# Patient Record
Sex: FEMALE | Race: BLACK OR AFRICAN AMERICAN
[De-identification: names, ages, dates, MRNs, and addresses within clinical notes are randomized per-mention and may not be internally consistent; named-entity substitution may affect disease eponyms.]

---

## 2017-06-13 ENCOUNTER — HOSPITAL ENCOUNTER (EMERGENCY)
Dept: HOSPITAL 31 - C.ER | Age: 42
Discharge: HOME | End: 2017-06-13
Payer: COMMERCIAL

## 2017-06-13 VITALS — RESPIRATION RATE: 18 BRPM | TEMPERATURE: 98.2 F | DIASTOLIC BLOOD PRESSURE: 75 MMHG | SYSTOLIC BLOOD PRESSURE: 132 MMHG

## 2017-06-13 VITALS — OXYGEN SATURATION: 98 % | HEART RATE: 75 BPM

## 2017-06-13 DIAGNOSIS — J06.9: Primary | ICD-10-CM

## 2017-06-13 NOTE — C.PDOC
History Of Present Illness


43 y/o female c/o sore throat, left side sinus pressure and facial pain x 3 days

, with subjective fever this morning. 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 

for are both being treated for strep throat.  c/o yellowish green nasal 

discharge. no ear pain. no cough or  sob.  


Time Seen by Provider: 06/13/17 10:58


Chief Complaint (Nursing): ENT Problem


History Per: Patient


History/Exam Limitations: None


Onset/Duration Of Symptoms: Days (3)


Current Symptoms Are (Timing): Still Present


Quality (Mouth/Throat): Other


Severity: Moderate


Anticoagulant/Antiplatlet Use?: No





Past Medical History


Reviewed: Historical Data, Nursing Documentation, Vital Signs


Vital Signs: 


 Last Vital Signs











Temp  98.2 F   06/13/17 12:19


 


Pulse  75   06/13/17 12:19


 


Resp  18   06/13/17 12:19


 


BP  132/75   06/13/17 12:19


 


Pulse Ox  98   06/13/17 12:34














- Medical History


Other PMH: sinus problems


Surgical History: No Surg Hx


Family History: States: Unknown Family Hx





- Social History


Hx Tobacco Use: No


Hx Alcohol Use: Yes


Hx Substance Use: No





- Immunization History


Hx Tetanus Toxoid Vaccination: No


Hx Influenza Vaccination: No


Hx Pneumococcal Vaccination: No





Review Of Systems


Constitutional: Positive for: Fever.  Negative for: Chills


ENT: Positive for: Throat Pain


Cardiovascular: Negative for: Chest Pain


Respiratory: Negative for: Shortness of Breath


Gastrointestinal: Negative for: Nausea, Vomiting, Diarrhea


Skin: Negative for: Rash





Physical Exam





- Physical Exam


Appears: Non-toxic, No Acute Distress


Skin: Normal Color, Warm


Head: Atraumatic, Normacephalic


Nose: Tenderness (Left Maxillary sinus Tenderness)


Oral Mucosa: Moist


Throat: Erythema (Mild erythema in throat), No Exudate


Neck: Normal ROM


Chest: Symmetrical


Cardiovascular: Rhythm Regular, No Murmur


Respiratory: No Rales, No Rhonchi, No Wheezing


Gastrointestinal/Abdominal: Soft, No Tenderness, No Guarding, No Rebound


Extremity: Normal ROM


Neurological/Psych: Oriented x3, Normal Speech, Normal Cognition





ED Course And Treatment


O2 Sat by Pulse Oximetry: 98 (RA)


Pulse Ox Interpretation: Normal





Medical Decision Making


Medical Decision Making: 





pt with sore throat and left side sinus pressure; rapid strep neg.  pt 

requesting rx for antibiotics in case symptoms worsen as she won't be able to 

get time off to see physician. I explained to patient no indication for 

antibiotics at this time, recommend analgesic and decongestant. 





Disposition


Counseled Patient/Family Regarding: Diagnosis, Need For Followup, Rx Given





- Disposition


Referrals: 


[facility] [Outside]


[facility] [Outside]


Disposition: HOME/ ROUTINE


Disposition Time: 12:06


Condition: STABLE


Additional Instructions: 


Gargle with warm salty water several times a day.  Take Sudafed or other 

decongestant as directed.  Neti pot or nasal saline will be helpful.  Take 

Tylenol or Ibuprofen for fever or pain.  Follow up in Medical clinic in a few 

days.   


Prescriptions: 


Azithromycin [Z-Rodger] 250 mg PO DAILY #6 tab


Instructions:  Pharyngitis (ED), Sinusitis (ED), Nasal Rinse (ED)





- Clinical Impression


Clinical Impression: 


 Upper respiratory infection








- PA / NP / Resident Statement


MD/DO has reviewed & agrees with the documentation as recorded.





- Scribe Statement


The provider has reviewed the documentation as recorded by the Tasneemibbina Mg





All medical record entries made by the Tasneemibbina were at my direction and 

personally dictated by me. I have reviewed the chart and agree that the record 

accurately reflects my personal performance of the history, physical exam, 

medical decision making, and the department course for this patient. I have 

also personally directed, reviewed, and agree with the discharge instructions 

and disposition.

## 2017-07-24 ENCOUNTER — HOSPITAL ENCOUNTER (EMERGENCY)
Dept: HOSPITAL 31 - C.ER | Age: 42
LOS: 1 days | Discharge: HOME | End: 2017-07-25
Payer: COMMERCIAL

## 2017-07-24 VITALS
TEMPERATURE: 98.3 F | SYSTOLIC BLOOD PRESSURE: 144 MMHG | RESPIRATION RATE: 17 BRPM | OXYGEN SATURATION: 95 % | HEART RATE: 81 BPM | DIASTOLIC BLOOD PRESSURE: 84 MMHG

## 2017-07-24 DIAGNOSIS — J32.9: Primary | ICD-10-CM

## 2017-07-25 NOTE — C.PDOC
History Of Present Illness


42 year old female with a Hx of sinusitis who presents to the ER with a 

complaint of a pressure headache and left sided facial and throat pain for the 

past week. Patient has been taking OTC medication with no relief to symptoms. 

Denies fever or chills.


Time Seen by Provider: 07/25/17 00:14


Chief Complaint (Nursing): ENT Problem


History Per: Patient


History/Exam Limitations: no limitations


Onset/Duration Of Symptoms: Days


Location Of Pain: Sinus/es, Headache


Associated Symptoms: denies: Fever, Chills


Ear Symptoms: Bilateral: None


Recent travel outside of the [place]: No





Past Medical History


Reviewed: Historical Data, Nursing Documentation, Vital Signs


Vital Signs: 


 Last Vital Signs











Temp  98.3 F   07/24/17 23:54


 


Pulse  81   07/24/17 23:54


 


Resp  17   07/24/17 23:54


 


BP  144/84   07/24/17 23:54


 


Pulse Ox  95   07/25/17 03:18














- Medical History


PMH: No Chronic Diseases


Surgical History: No Surg Hx


Family History: States: Unknown Family Hx





- Social History


Hx Tobacco Use: No


Hx Alcohol Use: Yes


Hx Substance Use: No





- Immunization History


Hx Tetanus Toxoid Vaccination: No


Hx Influenza Vaccination: No


Hx Pneumococcal Vaccination: No





Review Of Systems


Constitutional: Negative for: Fever, Chills


Neurological: Positive for: Headache





Physical Exam





- Physical Exam


Appears: Non-toxic


Skin: Normal Color, Warm, Dry


Head: Atraumatic, Normacephalic, Other (Left frontal sinus tenderness)


Eye(s): bilateral: Normal Inspection, PERRL, EOMI


Ear(s): Bilateral: Normal


Nose: Other (Enlarged nasal turbinates)


Oral Mucosa: Moist


Throat: Normal, No Erythema, No Exudate


Neck: Normal, Supple


Chest: Symmetrical, No Tenderness


Cardiovascular: Rhythm Regular, No Murmur


Respiratory: Normal Breath Sounds, No Rales, No Rhonchi, No Wheezing


Neurological/Psych: Oriented x3, Normal Speech, Normal Cognition





ED Course And Treatment


O2 Sat by Pulse Oximetry: 95 (Room air)


Pulse Ox Interpretation: Normal


Progress Note: Tylenol administered. On reevaluation, patient feels her pain as 

much improved, will instruct to follow up with PMD for further evaluation.





Disposition





- Disposition


Referrals: 


[facility] [Outside]


Disposition: HOME/ ROUTINE


Disposition Time: 00:19


Condition: STABLE


Additional Instructions: 


Take meds as directed





Follow up with PMD





Return to ER if worse 


Prescriptions: 


Azithromycin [Zithromax] 250 mg PO DAILY #6 tab


Cetirizine HCl [Zyrtec] 10 mg PO DAILY #20 capsule


Instructions:  Sinusitis (ED)





- Clinical Impression


Clinical Impression: 


 Sinusitis








- Scribe Statement


The provider has reviewed the documentation as recorded by the Scribbina Blackwell





All medical record entries made by the Jory were at my direction and 

personally dictated by me. I have reviewed the chart and agree that the record 

accurately reflects my personal performance of the history, physical exam, 

medical decision making, and the department course for this patient. I have 

also personally directed, reviewed, and agree with the discharge instructions 

and disposition.

## 2017-07-26 ENCOUNTER — HOSPITAL ENCOUNTER (EMERGENCY)
Dept: HOSPITAL 31 - C.ER | Age: 42
Discharge: HOME | End: 2017-07-26
Payer: COMMERCIAL

## 2017-07-26 VITALS
DIASTOLIC BLOOD PRESSURE: 115 MMHG | OXYGEN SATURATION: 99 % | SYSTOLIC BLOOD PRESSURE: 169 MMHG | TEMPERATURE: 98.3 F | RESPIRATION RATE: 14 BRPM | HEART RATE: 90 BPM

## 2017-07-26 DIAGNOSIS — R51: Primary | ICD-10-CM

## 2017-07-26 NOTE — C.PDOC
History Of Present Illness





43 yo female, presnets with ha, left sided sinus congestion, sore throat, since 

monday. pt was seen on monday d/c on antibiotics for sinusitis. pt states pain 

worsened. t states subjective fever last night. no abdominal pain, no cough, no 

other complaints


Time Seen by Provider: 07/26/17 09:33


Chief Complaint (Nursing): Headache





Past Medical History


Reviewed: Historical Data, Nursing Documentation, Vital Signs


Vital Signs: 


 Last Vital Signs











Temp  98.3 F   07/26/17 10:21


 


Pulse  90   07/26/17 10:21


 


Resp  14   07/26/17 10:21


 


BP  169/115 H  07/26/17 10:21


 


Pulse Ox  99   07/26/17 10:21











Family History: States: Unknown Family Hx





- Social History


Hx Tobacco Use: No


Hx Alcohol Use: Yes


Hx Substance Use: No





- Immunization History


Hx Tetanus Toxoid Vaccination: No


Hx Influenza Vaccination: No


Hx Pneumococcal Vaccination: No





Review Of Systems


Constitutional: Positive for: Fever


ENT: Positive for: Throat Pain, Other (left sided sinus congestion).  Negative 

for: Ear Pain


Respiratory: Negative for: Cough





Physical Exam





- Physical Exam


Appears: Well, No Acute Distress, Other (smiling in nad)


Skin: Normal Color, Warm, Dry


Head: Atraumatic, Normacephalic, Other ((+)left maxially sinus ttp)


Eye(s): bilateral: Normal Inspection, PERRL, EOMI


Nose: Normal


Oral Mucosa: Moist


Tongue: Normal Appearing


Lips: Normal Appearing


Throat: Erythema, No Exudate


Neck: Normal


Cardiovascular: Rhythm Regular


Respiratory: Normal Breath Sounds


Gastrointestinal/Abdominal: Normal Exam


Back: Normal Inspection


Extremity: Normal ROM


Neurological/Psych: Oriented x3, Normal Speech, Normal Cognition, Normal 

Cranial Nerves, Cerebellar Signs, Normal Motor, Normal Sensation





Medical Decision Making


Medical Decision Making: 


suspect ha 2/2 sinusitis. ct neg. pt well appeairng, neuro intact, notified of 

high blood pressure in er, advised outpt f/u. pt states pain resolved





Disposition





- Disposition


Referrals: 


Behin,Babak, MD [Staff Provider] - 


Disposition: HOME/ ROUTINE


Disposition Time: 10:25


Condition: STABLE


Additional Instructions: 


please see specialsit. return to er with worsening symptoms or concerns. please 

continue medications previous prescribed.


Prescriptions: 


Fluticasone Propionate [Flonase] 1 spr NS DAILY #1 bottle


Naproxen [Naprosyn] 500 mg PO BID PRN #14 tablet


 PRN Reason: Pain, Mild (1-3)


Instructions:  Sinusitis (ED), Acute Headache (ED)


Forms:  [facility] (English)





- Clinical Impression


Clinical Impression: 


 Headache

## 2017-07-26 NOTE — CT
PROCEDURE:  CT HEAD WITHOUT CONTRAST.



HISTORY:

ha



COMPARISON:

None available. 



TECHNIQUE:

Axial computed tomography images were obtained through the head/brain 

without intravenous contrast.  



Radiation dose:



Total exam DLP = 859.11 mGy-cm.



This CT exam was performed using one or more of the following dose 

reduction techniques: Automated exposure control, adjustment of the 

mA and/or kV according to patient size, and/or use of iterative 

reconstruction technique.



FINDINGS:



HEMORRHAGE:

No intracranial hemorrhage. 



BRAIN:

No mass effect or edema.  The gray-white matter differentiation 

appears intact. Please note that MRI with diffusion imaging is more 

sensitive in the detection of acute ischemic event.



VENTRICLES:

No hydrocephalus. 



CALVARIUM:

Unremarkable.



PARANASAL SINUSES:

Unremarkable as visualized. No significant inflammatory changes.



MASTOID AIR CELLS:

Unremarkable as visualized. No inflammatory changes.



OTHER FINDINGS:

None.



IMPRESSION:

No acute intracranial pathology identified.

## 2018-10-29 ENCOUNTER — HOSPITAL ENCOUNTER (EMERGENCY)
Dept: HOSPITAL 31 - C.ER | Age: 43
Discharge: HOME | End: 2018-10-29
Payer: SELF-PAY

## 2018-10-29 VITALS
RESPIRATION RATE: 18 BRPM | TEMPERATURE: 98.3 F | HEART RATE: 75 BPM | DIASTOLIC BLOOD PRESSURE: 97 MMHG | SYSTOLIC BLOOD PRESSURE: 143 MMHG

## 2018-10-29 VITALS — OXYGEN SATURATION: 100 %

## 2018-10-29 VITALS — BODY MASS INDEX: 27.3 KG/M2

## 2018-10-29 DIAGNOSIS — N93.8: Primary | ICD-10-CM

## 2018-10-29 LAB
ALBUMIN SERPL-MCNC: 4 G/DL (ref 3.5–5)
ALBUMIN/GLOB SERPL: 1.3 {RATIO} (ref 1–2.1)
ALT SERPL-CCNC: 33 U/L (ref 9–52)
APTT BLD: 38 SECONDS (ref 21–34)
AST SERPL-CCNC: 24 U/L (ref 14–36)
BASOPHILS # BLD AUTO: 0.1 K/UL (ref 0–0.2)
BASOPHILS NFR BLD: 0.9 % (ref 0–2)
BUN SERPL-MCNC: 15 MG/DL (ref 7–17)
CALCIUM SERPL-MCNC: 9.5 MG/DL (ref 8.6–10.4)
EOSINOPHIL # BLD AUTO: 0.6 K/UL (ref 0–0.7)
EOSINOPHIL NFR BLD: 7.5 % (ref 0–4)
ERYTHROCYTE [DISTWIDTH] IN BLOOD BY AUTOMATED COUNT: 14.3 % (ref 11.5–14.5)
GFR NON-AFRICAN AMERICAN: > 60
HGB BLD-MCNC: 12.6 G/DL (ref 11–16)
INR PPP: 1
LYMPHOCYTES # BLD AUTO: 2.2 K/UL (ref 1–4.3)
LYMPHOCYTES NFR BLD AUTO: 27.7 % (ref 20–40)
MCH RBC QN AUTO: 27.2 PG (ref 27–31)
MCHC RBC AUTO-ENTMCNC: 33.3 G/DL (ref 33–37)
MCV RBC AUTO: 81.6 FL (ref 81–99)
MONOCYTES # BLD: 0.7 K/UL (ref 0–0.8)
MONOCYTES NFR BLD: 9 % (ref 0–10)
NEUTROPHILS # BLD: 4.3 K/UL (ref 1.8–7)
NEUTROPHILS NFR BLD AUTO: 54.9 % (ref 50–75)
NRBC BLD AUTO-RTO: 0 % (ref 0–2)
PLATELET # BLD: 292 K/UL (ref 130–400)
PMV BLD AUTO: 7.3 FL (ref 7.2–11.7)
PROTHROMBIN TIME: 10.7 SECONDS (ref 9.7–12.2)
RBC # BLD AUTO: 4.63 MIL/UL (ref 3.8–5.2)
WBC # BLD AUTO: 7.8 K/UL (ref 4.8–10.8)

## 2018-10-29 PROCEDURE — 85610 PROTHROMBIN TIME: CPT

## 2018-10-29 PROCEDURE — 85025 COMPLETE CBC W/AUTO DIFF WBC: CPT

## 2018-10-29 PROCEDURE — 80053 COMPREHEN METABOLIC PANEL: CPT

## 2018-10-29 PROCEDURE — 85730 THROMBOPLASTIN TIME PARTIAL: CPT

## 2018-10-29 PROCEDURE — 86900 BLOOD TYPING SEROLOGIC ABO: CPT

## 2018-10-29 PROCEDURE — 76830 TRANSVAGINAL US NON-OB: CPT

## 2018-10-29 PROCEDURE — 96360 HYDRATION IV INFUSION INIT: CPT

## 2018-10-29 PROCEDURE — 76856 US EXAM PELVIC COMPLETE: CPT

## 2018-10-29 PROCEDURE — 86850 RBC ANTIBODY SCREEN: CPT

## 2018-10-29 PROCEDURE — 99285 EMERGENCY DEPT VISIT HI MDM: CPT

## 2018-10-29 NOTE — C.PDOC
History Of Present Illness


43 year old female presents to the ED for evaluation of vaginal bleeding which 

began 4 days ago. Patient states she was taking birth control, which was causing

an increase in her blood pressure. When she was evaluated by clinic, she was 

switched to a new pill. She has been taking Norethindrone 0.35mg daily since 

. Patient states she developed heavy vaginal bleeding associated with 

weakness 4 days ago. She reports using one pad per hour. Patient states she is 

scheduled for an appointment with her OB/GYN for tomorrow. Patient denies 

abdominal pain. 


Time Seen by Provider: 10/29/18 11:57


Chief Complaint (Nursing): Female Genitourinary


History Per: Patient


History/Exam Limitations: no limitations


Onset/Duration Of Symptoms: Days (4)


Current Symptoms Are (Timing): Still Present


Quality Of Discomfort: denies: "Pain"


Additional History Per: Patient


Abnormal Vaginal Bleeding: Yes





Past Medical History


Reviewed: Historical Data, Nursing Documentation, Vital Signs


Vital Signs: 





                                Last Vital Signs











Temp  98.8 F   10/29/18 11:46


 


Pulse  78   10/29/18 12:49


 


Resp  21   10/29/18 12:49


 


BP  136/78   10/29/18 12:49


 


Pulse Ox  100   10/29/18 12:49














- Medical History


PMH: No Chronic Diseases


Surgical History: 


Family History: States: Unknown Family Hx





- Social History


Hx Tobacco Use: No


Hx Alcohol Use: Yes


Hx Substance Use: No





- Immunization History


Hx Tetanus Toxoid Vaccination: No


Hx Influenza Vaccination: No


Hx Pneumococcal Vaccination: No





Review Of Systems


Gastrointestinal: Negative for: Abdominal Pain


Genitourinary: Positive for: Vaginal Bleeding





Physical Exam





- Physical Exam


Appears: Non-toxic, No Acute Distress


Skin: Normal Color, Warm, Dry


Head: Atraumatic, Normacephalic


Eye(s): bilateral: Normal Inspection


Oral Mucosa: Moist


Neck: Supple


Chest: Symmetrical, No Deformity, No Tenderness


Cardiovascular: Rhythm Regular, No Murmur


Respiratory: Normal Breath Sounds, No Rales, No Rhonchi, No Wheezing


Gastrointestinal/Abdominal: Soft, No Tenderness, No Guarding, No Rebound


Pelvic: Vaginal Bleeding (active, noted during external exam )


Extremity: Normal ROM, Capillary Refill (less than 2 seconds )


Neurological/Psych: Oriented x3, Normal Speech, Normal Cognition





ED Course And Treatment





- Laboratory Results


Result Diagrams: 


                                 10/29/18 13:45





                                 10/29/18 13:45


O2 Sat by Pulse Oximetry: 100 (on RA )


Pulse Ox Interpretation: Normal





- CT Scan/US


  ** ultrasound 


Other Rad Studies (CT/US): Read By Radiologist, Radiology Report Reviewed


CT/US Interpretation: Date of service:  10/29/2018.  HISTORY:  vaginal bleeding.

 COMPARISON:  Pelvic ultrasound dated 2015.  TECHNIQUE:  Grayscale, color 

Doppler and spectral evaluation the pelvis performed transabdominally and 

transvaginally.  FINDINGS:  UTERUS:  Measures 9.4 x 5.0 x 6.0 cm.  Anteverted.  

Normal in size and appearance. Fundal fibroid measuring 2.4 x 2.3 x 2.2 cm.  

Anterior intramural fibroid measuring 1.7 x 1.4 x 1.5 cm.  ENDOMETRIUM:  

Measures 4 mm in diameter. Unremarkable.  CERVIX:  No cervical abnormality 

identified.  RIGHT OVARY:  Measures 2.8 x 1.4 x 2.4 cm. No solid mass. Normal 

flow.  LEFT OVARY:  Measures 3.3 x 1.7 x 3.5 cm. Dominant follicle measuring 2.2

x 1.4 x 1.9 cm.  Normal flow.  FREE FLUID:  No significant free fluid noted.  

OTHER FINDINGS:  None.  IMPRESSION:  Multi fibroid uterus, the largest of which 

measures 2.4 cm in the fundus.


Progress Note: Bloodwork and ultrasound ordered.  IV Fluids given.  Bloodwork 

results do not indicate anemia. Ultrasound results show two small fibroids.  

Since patient has active bleeding, case was discussed with Dr. Plummer (OB/GYN on

call). Dr. Plummer advises to change norethindrone dose to 5mg TID for 3 days, 

5mg BID for 1 week, and 5mg QD for two weeks.  On reassessment, patient is 

resting comfortably, showing no signs of distress and is stable for discharge. 

Patient is advised to follow up with her OB/GYN tomorrow as scheduled. Advised 

to return to the ED immediately if symptoms worsen.





Disposition





- Disposition


Disposition: HOME/ ROUTINE


Disposition Time: 15:45


Condition: STABLE


Additional Instructions: 


Follow up with your PMD and OBGYN within 1-2 days. Return to ED if feel worse.


Prescriptions: 


Norethindrone Acetate [Norethindrone AC (Lupaneta)] 5 mg PO .AS DIRECTED #37 

tablet


Instructions:  Heavy Periods (DC)


Forms:  [facility] (English)





- Clinical Impression


Clinical Impression: 


 DUB (dysfunctional uterine bleeding)








- PA / NP / Resident Statement


MD/DO has reviewed & agrees with the documentation as recorded.





- Scribe Statement


The provider has reviewed the documentation as recorded by the Scribe (Debbie Zhang)








All medical record entries made by the Scribe were at my direction and 

personally dictated by me. I have reviewed the chart and agree that the record 

accurately reflects my personal performance of the history, physical exam, 

medical decision making, and the department course for this patient. I have also

personally directed, reviewed, and agree with the discharge instructions and 

disposition.

## 2018-10-29 NOTE — US
Date of service: 



10/29/2018



HISTORY:

vaginal bleeding



COMPARISON:

Pelvic ultrasound dated 08/03/2015.



TECHNIQUE:

Grayscale, color Doppler and spectral evaluation the pelvis performed 

transabdominally and transvaginally.



FINDINGS:



UTERUS:

Measures 9.4 x 5.0 x 6.0 cm.  Anteverted.  Normal in size and 

appearance. Fundal fibroid measuring 2.4 x 2.3 x 2.2 cm.  Anterior 

intramural fibroid measuring 1.7 x 1.4 x 1.5 cm.



ENDOMETRIUM:

Measures 4 mm in diameter. Unremarkable. 



CERVIX:

No cervical abnormality identified.



RIGHT OVARY:

Measures 2.8 x 1.4 x 2.4 cm. No solid mass. Normal flow. 



LEFT OVARY:

Measures 3.3 x 1.7 x 3.5 cm. Dominant follicle measuring 2.2 x 1.4 x 

1.9 cm.  Normal flow. 



FREE FLUID:

No significant free fluid noted.



OTHER FINDINGS:

None. 



IMPRESSION:

Multi fibroid uterus, the largest of which measures 2.4 cm in the 

fundus.